# Patient Record
Sex: MALE | NOT HISPANIC OR LATINO | Employment: FULL TIME | URBAN - METROPOLITAN AREA
[De-identification: names, ages, dates, MRNs, and addresses within clinical notes are randomized per-mention and may not be internally consistent; named-entity substitution may affect disease eponyms.]

---

## 2017-01-04 ENCOUNTER — OFFICE VISIT (OUTPATIENT)
Dept: ORTHOPEDIC SURGERY | Facility: CLINIC | Age: 62
End: 2017-01-04

## 2017-01-04 DIAGNOSIS — S43.432D GLENOID LABRUM TEAR, LEFT, SUBSEQUENT ENCOUNTER: Primary | ICD-10-CM

## 2017-01-04 DIAGNOSIS — S46.012D TRAUMATIC ROTATOR CUFF TEAR, LEFT, SUBSEQUENT ENCOUNTER: ICD-10-CM

## 2017-01-04 PROCEDURE — 99213 OFFICE O/P EST LOW 20 MIN: CPT | Performed by: ORTHOPAEDIC SURGERY

## 2017-01-04 NOTE — MR AVS SNAPSHOT
Mert Hewitt   2017 2:15 PM   Office Visit    Dept Phone:  [phone]   Encounter #:  [id number]    Provider:  Haroon Mckoy MD   Department:  [facility]                Your Full Care Plan              Your Updated Medication List          This list is accurate as of: 17  3:04 PM.  Always use your most recent med list.                ferrous sulfate 325 (65 FE) MG tablet       glucosamine sulfate 500 MG capsule capsule       KOMBIGLYZE XR 5-1000 MG tablet sustained-release 24 hour   Generic drug:  SAXagliptin-MetFORMIN ER       lisinopril 5 MG tablet   Commonly known as:  PRINIVIL,ZESTRIL       meloxicam 7.5 MG tablet   Commonly known as:  MOBIC   Take 1 tablet by mouth At Night As Needed for moderate pain (4-6).       MULTI VITAMIN DAILY PO       nateglinide 120 MG tablet   Commonly known as:  STARLIX       ONE TOUCH ULTRA TEST test strip   Generic drug:  glucose blood       pioglitazone 45 MG tablet   Commonly known as:  ACTOS       pravastatin 40 MG tablet   Commonly known as:  PRAVACHOL       terbinafine 250 MG tablet   Commonly known as:  lamiSIL       vitamin B-12 1000 MCG tablet   Commonly known as:  CYANOCOBALAMIN               Instructions     None    Patient Instructions History      Upcoming Appointments     Visit Type Date Time Department    FOLLOW UP 2017  2:15 PM [facility]    FOLLOW UP 2017  2:15 PM [facility]      [facility] Signup     [facility] allows you to send messages to your doctor, view your test results, renew your prescriptions, schedule appointments, and more. To sign up, go to [employer] and click on the Sign Up Now link in the New User? box. Enter your [facility] Activation Code exactly as it appears below along with the last four digits of your Social Security Number and your Date of Birth () to complete the sign-up process. If you do not sign up before  the expiration date, you must request a new code.    [employer] Activation Code: [email or web address]  Expires: 1/18/2017  3:04 PM    If you have questions, you can email The [employer] easily [email or web address] or call [phone] to talk to our [employer] staff. Remember, [employer] is NOT to be used for urgent needs. For medical emergencies, dial 911.               Other Info from Your Visit           Your Appointments     Apr 12, 2017  2:15 PM EDT   Follow Up with Haroon Mckoy MD   [facility] (--)    [address] [place] [address]   [phone]           Arrive 15 minutes prior to appointment.              Allergies     No Known Allergies      Reason for Visit     Left Shoulder - Follow-up           Vital Signs     Smoking Status                   Never Smoker

## 2017-01-04 NOTE — PROGRESS NOTES
Chief Complaint   Patient presents with   • Left Shoulder - Follow-up           HPI  The patient is here today for a follow up of his left shoulder. He has pain on the lateral aspect of the shoulder.  He has worked hard with a home-based physical therapy program and recovered a full range of motion.  He does work with his arm extended into the overhead position, which causes him pain and discomfort.  He has some difficulty with cross body activity as well.  The patient states that when he lifts more than 20 pounds, he has recurrence of pain and is requesting restriction on his work status to be limited to 20 pounds of lifting.  Recently, he has developed bronchitis and his family care physician gave him a Depo-Medrol dosepak, which has helped his symptoms tremendously.  This has helped to decrease his pain and improve his mobility significantly.          There were no vitals filed for this visit.        Review of Systems   All other systems reviewed and are negative.          Physical Exam   Constitutional: He appears well-developed.   HENT:   Head: Normocephalic.   Nose: Nose normal.   Eyes: Conjunctivae are normal. Pupils are equal, round, and reactive to light.   Neck: Normal range of motion.   Cardiovascular: Normal rate and intact distal pulses.    Pulmonary/Chest: Effort normal and breath sounds normal.   Abdominal: Soft. Bowel sounds are normal.   Musculoskeletal: Normal range of motion.   Neurological: He is alert. He has normal reflexes.   Skin: Skin is warm.   Psychiatric: He has a normal mood and affect. His behavior is normal. Judgment and thought content normal.   Vitals reviewed.          Joint/Body Part Specific Exam:  left shoulder. The glenohumeral joint is extremely tender over the anterior aspect of the articulation. Axillary nerve function is well preserved. Radial artery pulses are palpable. Forward flexion is associated with a click and a distinct pop. Neer sign is positive. EER sign is  positive. Bobby sign is positive. Skin and soft tissues are slightly swollen. AC joint is mildly tender. The pain can be reproduced with external rotation, abduction and axial loading of the joint. There is no evidence of multidirectional instability. The pain level is 5.  He has a full ROM in forward flexion, abduction and external rotation.      X-RAY Report:        Diagnostics:        Mert was seen today for follow-up.    Diagnoses and all orders for this visit:    Glenoid labrum tear, left, subsequent encounter    Traumatic rotator cuff tear, left, subsequent encounter          Procedures        Plan:   Continue with home-based exercise program with stretching/strengthening exercise to prevent a frozen shoulder.  Ibuprofen 600 mg tab 1 p.o. b.i.d. p.r.n. pain.  Tablet Mobic 7.5 mg tab 1 p.o. daily.  Nonoperative care discussed with the patient.  He is not interested in a steroid injection because that elevates his blood sugar levels and causes him to have problems with management of his diabetes.  Work restriction limited to maximum lifting of 20 pounds with avoidance of repetitive lifting and loading and no repetitive overhead lifting.  Follow up in my office in 3 months.  Patient is doing well with conservative, nonoperative care.

## 2017-01-04 NOTE — LETTER
January 4, 2017     Patient: Mert Hewitt   YOB: 1955   Date of Visit: 1/4/2017       To Whom It May Concern:    It is my medical opinion that Mert Hewitt may only lift up to a maximum of 20lbs, and may not do any overhead lifting. If you have any questions please feel free to contact my office.        Sincerely,        Haroon Mckoy MD

## 2017-04-12 ENCOUNTER — OFFICE VISIT (OUTPATIENT)
Dept: ORTHOPEDIC SURGERY | Facility: CLINIC | Age: 62
End: 2017-04-12

## 2017-04-12 VITALS — BODY MASS INDEX: 26.34 KG/M2 | WEIGHT: 184 LBS | HEIGHT: 70 IN

## 2017-04-12 DIAGNOSIS — S46.012D TRAUMATIC ROTATOR CUFF TEAR, LEFT, SUBSEQUENT ENCOUNTER: ICD-10-CM

## 2017-04-12 DIAGNOSIS — S43.432D GLENOID LABRUM TEAR, LEFT, SUBSEQUENT ENCOUNTER: Primary | ICD-10-CM

## 2017-04-12 PROCEDURE — 99213 OFFICE O/P EST LOW 20 MIN: CPT | Performed by: ORTHOPAEDIC SURGERY

## 2017-04-12 RX ORDER — SITAGLIPTIN AND METFORMIN HYDROCHLORIDE 1000; 50 MG/1; MG/1
TABLET, FILM COATED ORAL
Refills: 0 | COMMUNITY
Start: 2017-04-08

## 2017-04-12 NOTE — PROGRESS NOTES
Chief Complaint   Patient presents with   • Left Shoulder - Follow-up, Pain           HPI  Patient is here today for a follow up of his left shoulder.  Patient is doing reasonably well at this point.  He is quite active with his shoulder especially in overhead abduction.  He has recently built and outdoor shed with a platform and did use power tools and pneumatic tools without too much difficulty.  He does have some pain and discomfort and an occasional click in the anterior aspect of the glenohumeral joint.  He does feel some amount of pain and discomfort at the end of the day and is occasionally using anti-inflammatory medication to help minimize his symptoms.  He does have some difficulty in sleeping in bed at night especially if his shoulder is in the decubitus and dependent position.  Patient would like to attend some physical therapy in an outpatient basis to help improve his strength and range of motion especially in overhead abduction.        There were no vitals filed for this visit.        Review of Systems   Constitutional: Negative for chills, fever and unexpected weight change.   HENT: Negative for trouble swallowing and voice change.    Eyes: Negative for visual disturbance.   Respiratory: Negative for cough and shortness of breath.    Cardiovascular: Negative for chest pain and leg swelling.   Gastrointestinal: Negative for abdominal pain, nausea and vomiting.   Endocrine: Negative for cold intolerance and heat intolerance.   Genitourinary: Negative for difficulty urinating, frequency and urgency.   Musculoskeletal: Positive for arthralgias and myalgias.   Skin: Negative for rash and wound.   Allergic/Immunologic: Negative for immunocompromised state.   Neurological: Negative for weakness and numbness.   Hematological: Does not bruise/bleed easily.   Psychiatric/Behavioral: Negative for dysphoric mood. The patient is not nervous/anxious.            Physical Exam   Constitutional: He appears  well-developed and well-nourished.   HENT:   Head: Normocephalic and atraumatic.   Eyes: EOM are normal. Pupils are equal, round, and reactive to light.   Neck: Normal range of motion. Neck supple.   Cardiovascular: Normal rate and intact distal pulses.    Pulmonary/Chest: Effort normal and breath sounds normal.   Abdominal: Soft. Bowel sounds are normal.   Musculoskeletal: Normal range of motion. He exhibits edema and tenderness.   Neurological: He is alert. He has normal reflexes.   Skin: Skin is warm and dry.   Psychiatric: He has a normal mood and affect. His behavior is normal. Judgment and thought content normal.   Nursing note and vitals reviewed.          Joint/Body Part Specific Exam:    left shoulder. The shoulder is extremely tender anteriorly. There is tenderness over the insertion of the rotator cuff over the greater tuberosity of the humerus. Slight proximal migration of the humeral head is noted. AC joint is tender. Crossover adduction test is positive. Drop arm sign is positive. Forward flexion is 0-140° degrees, abduction is 0-160° degrees, external rotation is 0-40° degrees. Patient has mild atrophy both of the supra and infraspinatus fossa. Sulcus sign is negative. There is no evidence of multidirectional instability. Neer test is positive on compression. Skin and soft tissue tenderness is noted. Patient’s strength in abduction and external rotation are extremely lacking. The pain level is 2.    X-RAY Report:        Diagnostics:  Prior MRI reports were discussed with the patient great gisele Painting was seen today for follow-up and pain.    Diagnoses and all orders for this visit:    Glenoid labrum tear, left, subsequent encounter  -     Ambulatory Referral to Physical Therapy Evaluate and treat    Traumatic rotator cuff tear, left, subsequent encounter  -     Ambulatory Referral to Physical Therapy Evaluate and treat          Procedures        Plan:  Stretching and strengthening exercises  including the use of an overhead pulley.    Tablet Mobic 7.5 mg tab 1 by mouth daily at bedtime when necessary pain and discomfort.    Re-injury precautions.    Note for physical therapy given to the patient so that he can participate in an outpatient physical therapy program.    Follow-up in my office in 3 months.    Injection with steroid discussed with the patient and offered to him but he states his pain is not severe enough to require that.

## 2017-07-19 ENCOUNTER — OFFICE VISIT (OUTPATIENT)
Dept: ORTHOPEDIC SURGERY | Facility: CLINIC | Age: 62
End: 2017-07-19

## 2017-07-19 VITALS — BODY MASS INDEX: 26.63 KG/M2 | HEIGHT: 70 IN | WEIGHT: 186 LBS | TEMPERATURE: 98.4 F

## 2017-07-19 DIAGNOSIS — S43.432D GLENOID LABRUM TEAR, LEFT, SUBSEQUENT ENCOUNTER: ICD-10-CM

## 2017-07-19 DIAGNOSIS — S46.012D TRAUMATIC ROTATOR CUFF TEAR, LEFT, SUBSEQUENT ENCOUNTER: Primary | ICD-10-CM

## 2017-07-19 PROCEDURE — 99213 OFFICE O/P EST LOW 20 MIN: CPT | Performed by: ORTHOPAEDIC SURGERY

## 2017-07-19 NOTE — PROGRESS NOTES
Chief Complaint   Patient presents with   • Left Shoulder - Follow-up, Pain           HPI  PT is here for F/U for LT Shoulder Pain and discomfort.  Patient has some weakness in abduction and difficulty in reaching into the overhead position.  He has some nighttime pain and discomfort as well.  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  He is managing his symptoms well with conservative, nonoperative care.  He states that he might have recently hurt his knee and his calf because he has a lot of pain and tenderness over the gastroc musculature.  He has some difficulty with full plantar flexion of the hindfoot.  This does not cause him difficulty with walking and overall seems to be actually improving since the time of his injury.        Vitals:    07/19/17 1519   Temp: 98.4 °F (36.9 °C)           Review of Systems   Constitutional: Negative.    HENT: Negative.    Eyes: Negative.    Respiratory: Negative.    Cardiovascular: Negative.    Gastrointestinal: Negative.    Endocrine: Negative.    Genitourinary: Negative.    Musculoskeletal: Negative.    Skin: Negative.    Allergic/Immunologic: Negative.    Neurological: Negative.    Hematological: Negative.    Psychiatric/Behavioral: Negative.            Physical Exam   Constitutional: He is oriented to person, place, and time. He appears well-nourished.   HENT:   Head: Atraumatic.   Eyes: EOM are normal.   Neck: Neck supple.   Cardiovascular: Normal rate and intact distal pulses.    Pulmonary/Chest: Effort normal and breath sounds normal.   Abdominal: Soft. Bowel sounds are normal.   Musculoskeletal: Normal range of motion.   Neurological: He is alert and oriented to person, place, and time. He has normal reflexes.   Skin: Skin is dry.   Psychiatric: He has a normal mood and affect. His behavior is normal. Judgment and thought  content normal.   Nursing note and vitals reviewed.          Joint/Body Part Specific Exam:  left Shoulder. Patient has signs of impingement with internal and external rotation. There is a lot of pain and tenderness for the patient over the subcromial bursa. Bobby’s sign is positive. Neer sign is positive. Forward flexion is 0-120 degrees, abduction is 0-130° degrees, external rotation is 0-40 degrees. Rotator cuff function is fairly well preserved except for the impingement at 90 degrees. Apprehension sign is negative. Axillary nerve function is well preserved. Radial artery pulses are palpable. There is no evidence of multidirectional instability. Sulcus sign is negative. Drop arm sign is negative. The patient has some ill-defined tenderness over the greater tuberosity of the humerus. The pain level is 3.    Right calf: Patient has pain and tenderness in the mid gastroc.  He possibly has a muscle strain.  There is no evidence of Achilles tendon injury.  He is neurovascularly intact.  He can easily perform a single heel raise.  There is some prominence of the muscle fibers and an underlying partial thickness tear of the gastroc muscle cannot be ruled out.  X-RAY Report:        Diagnostics:        Mert was seen today for follow-up and pain.    Diagnoses and all orders for this visit:    Traumatic rotator cuff tear, left, subsequent encounter    Glenoid labrum tear, left, subsequent encounter          Procedures        Plan:  Stretching and strengthening exercises to prevent a frozen shoulder.    Tablet Mobic 7.5 mg tab 1 by mouth daily for pain swelling and inflammation.    Reinjury precautions.    Calcium and vitamin D for bone health.    Work note given to the patient to avoid active abduction and minimize the amount of weight he lifts in the overhead position to minimize the possibility of reinjury while he is at work.    Intra-articular steroid injection discussed and offered to the patient but he  declines.    Follow-up in my office in 3 months.    Nonoperative management of the gastroc muscle strain.

## 2017-11-01 ENCOUNTER — CLINICAL SUPPORT (OUTPATIENT)
Dept: ORTHOPEDIC SURGERY | Facility: CLINIC | Age: 62
End: 2017-11-01

## 2017-11-01 DIAGNOSIS — S46.012D TRAUMATIC INCOMPLETE TEAR OF LEFT ROTATOR CUFF, SUBSEQUENT ENCOUNTER: Primary | ICD-10-CM

## 2017-11-01 PROCEDURE — 99213 OFFICE O/P EST LOW 20 MIN: CPT | Performed by: ORTHOPAEDIC SURGERY

## 2017-11-01 NOTE — PROGRESS NOTES
Chief Complaint   Patient presents with   • Left Shoulder - Follow-up     Chief complaint: left shoulder pain with limited range of motion.      HPI The patient is here today for a follow up of his left shoulder.  Patient has pain and discomfort on the lateral aspect of the left shoulder.  He has some weakness in abduction.  He finds it difficult to sleep in bed at night because of the pain and discomfort.  He cannot lie down with his left shoulder in a recumbent position.  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  This change in occupation has made his life a whole lot better.  His pain swelling and discomfort have all significantly decreased since his job description has changed.  The patient is not sure how long he will be able to keep his new position but in any event he is much less symptomatic at this point.  He denies any recent history of trauma.  He does use his upper extremity in the abducted position on a repetitive basis.        There were no vitals filed for this visit.        Review of Systems   Constitutional: Negative.    HENT: Negative.    Respiratory: Negative.    Cardiovascular: Negative.    Gastrointestinal: Negative.    Endocrine: Negative.    Genitourinary: Negative.    Musculoskeletal: Positive for joint swelling and neck pain.   Skin: Negative.    Allergic/Immunologic: Negative.    Neurological: Negative.    Hematological: Negative.    Psychiatric/Behavioral: Negative.            Physical Exam   Constitutional: He is oriented to person, place, and time. He appears well-nourished.   HENT:   Head: Atraumatic.   Eyes: EOM are normal.   Neck: Neck supple.   Cardiovascular: Intact distal pulses.    Pulmonary/Chest: Breath sounds normal.   Abdominal: Bowel sounds are normal.   Musculoskeletal: He exhibits edema and tenderness. He exhibits no deformity.   Neurological: He is alert and oriented to person, place, and time. He has normal reflexes.    Skin: Skin is dry.   Psychiatric: He has a normal mood and affect. His behavior is normal. Judgment and thought content normal.   Nursing note and vitals reviewed.          Joint/Body Part Specific Exam:  right Shoulder. Patient has signs of impingement with internal and external rotation. There is a lot of pain and tenderness for the patient over the subcromial bursa. Bobby’s sign is positive. Neer sign is positive. Forward flexion is 0-120° degrees, abduction is 0-130° degrees, external rotation is 0-30° degrees. Rotator cuff function is fairly well preserved except for the impingement at 90 degrees. Apprehension sign is negative. Axillary nerve function is well preserved. Radial artery pulses are palpable. There is no evidence of multidirectional instability. Sulcus sign is negative. Drop arm sign is negative. The patient has some ill-defined tenderness over the greater tuberosity of the humerus. The pain level is 3.  Apprehension sign is negative.  Sulcus sign is negative.  There is no evidence of multidirectional instability.      X-RAY Report:        Diagnostics:        Mert was seen today for follow-up.    Diagnoses and all orders for this visit:    Traumatic incomplete tear of left rotator cuff, subsequent encounter          Procedures        Plan:  Continue with physical therapy with a home based exercise program with stretching and strengthening exercising including the use of overhead pulleys.    Calcium and vitamin D for bone health.    Tablet ibuprofen 600 mg orally twice a day when necessary pain and discomfort.    Avoid repetitive loading of the shoulder.    Patient has a prescription of Mobic 7.5 mg tab 1 by mouth daily at bedtime when necessary pain and swelling.  I have refilled his prescription for him.    Avoid injury to the shoulder.    Possibility of a steroid injection to help minimize his symptoms has been discussed and offered to the patient.    Nonoperative care for the patient at  this point.    Follow-up in office in 3 months.

## 2018-10-24 ENCOUNTER — OFFICE VISIT (OUTPATIENT)
Dept: ORTHOPEDIC SURGERY | Facility: CLINIC | Age: 63
End: 2018-10-24

## 2018-10-24 VITALS — BODY MASS INDEX: 26.63 KG/M2 | WEIGHT: 186 LBS | HEIGHT: 70 IN

## 2018-10-24 DIAGNOSIS — M25.511 CHRONIC RIGHT SHOULDER PAIN: Primary | ICD-10-CM

## 2018-10-24 DIAGNOSIS — G89.29 CHRONIC RIGHT SHOULDER PAIN: Primary | ICD-10-CM

## 2018-10-24 PROCEDURE — 99213 OFFICE O/P EST LOW 20 MIN: CPT | Performed by: ORTHOPAEDIC SURGERY

## 2018-10-24 PROCEDURE — 73030 X-RAY EXAM OF SHOULDER: CPT | Performed by: ORTHOPAEDIC SURGERY

## 2018-10-24 NOTE — PROGRESS NOTES
Chief Complaint   Patient presents with   • Right Shoulder - Pain             HPI the patient is here today for right shoulder pain and discomfort.  His symptoms have become progressively worse over the past few months.  He denies any history of trauma.  The patient states that he is working quite hard lifting heavy objects to be successful in his new job.  He feels that he might have injured his shoulder at some point during the transfer process.  He does not have any weakness in abduction.  There is progressive pain and discomfort and he cannot reach beyond 90° of forward flexion or abduction at the shoulder.  He has quite a bit of nighttime pain and discomfort.  Hot showers seem to help his symptoms in terms of decreased pain.  He is a diabetic and is trying hard to control his blood sugar levels.  He had a similar frozen shoulder on the left side that ended up needing a manipulation under anesthesia several years ago.  The patient thinks that he has the same pathology on the right shoulder.  I do tend to agree with his symptoms and his evaluation.           No Known Allergies      Social History     Social History   • Marital status:      Spouse name: N/A   • Number of children: N/A   • Years of education: N/A     Occupational History   • Not on file.     Social History Main Topics   • Smoking status: Never Smoker   • Smokeless tobacco: Not on file   • Alcohol use No   • Drug use: No   • Sexual activity: Not on file     Other Topics Concern   • Not on file     Social History Narrative   • No narrative on file       Family History   Problem Relation Age of Onset   • Diabetes Other        Past Surgical History:   Procedure Laterality Date   • KNEE SURGERY Right 2012   • SHOULDER SURGERY Left 2012    Manipulation        Past Medical History:   Diagnosis Date   • Anemia    • Diabetes mellitus (CMS/[facility])            There were no vitals filed for this visit.          Review of Systems   Constitutional: Negative.     HENT: Negative.    Eyes: Negative.    Respiratory: Negative.    Cardiovascular: Negative.    Gastrointestinal: Negative.    Endocrine: Negative.    Genitourinary: Negative.    Musculoskeletal: Positive for gait problem.   Skin: Negative.    Allergic/Immunologic: Negative.    Hematological: Negative.    Psychiatric/Behavioral: Negative.            Physical Exam   Constitutional: He is oriented to person, place, and time. He appears well-nourished.   HENT:   Head: Atraumatic.   Eyes: EOM are normal.   Neck: Neck supple.   Cardiovascular: Normal rate, regular rhythm and normal heart sounds.    Pulmonary/Chest: Effort normal and breath sounds normal.   Abdominal: Bowel sounds are normal.   Musculoskeletal: He exhibits edema and tenderness.   Neurological: He is oriented to person, place, and time.   Skin: Skin is warm. Capillary refill takes 2 to 3 seconds.   Psychiatric: He has a normal mood and affect. His behavior is normal. Thought content normal.   Nursing note and vitals reviewed.              Joint/Body Part Specific Exam:  right Shoulder. The shoulder range of motion is extremely limited both in forward flexion, abduction and external rotation. Rotator cuff function cannot be determined because of lack of motion. The scapula is moving with the humerus upon attempted abduction. The range of motion is abduction 0-80 degrees, forward fexion 0-90 degrees, external rotation 0-10 degrees.  Skin and soft tissues are somewhat swollen and tender. There is anterior joint line tenderness. There is some winging of the scapula. Axillary nerve function is well preserved. Radial artery pulses are palpable. The motion is extremely limited when compared to the contralateral extremity. The pain level is 5 .          X-RAY Report:  right Shoulder X-Ray  Indication: pain and limited ROM of the shoulder  AP and Lateral  Findings: sclerosis over greater tuberosity of humerus  no bony lesion  Soft tissues within normal  limits  decreased joint spaces  Hardware appropriately positioned not applicable      no prior studies available for comparison.    X-RAY was ordered and reviewed by Haroon Mckoy MD              Diagnostics:            Mert was seen today for pain.    Diagnoses and all orders for this visit:    Chronic right shoulder pain  -     XR Shoulder 2+ View Right            Procedures          I provided this patient with educational materials regarding exercise and bone health.        Plan: Stretching and strengthening exercises of the right shoulder to overcome the adhesive capsulitis and a frozen shoulder.    Ice to the shoulder for comfort.    Tablet ibuprofen 600 mg orally twice a day for pain swelling and discomfort.    Discussed with the patient about steroid injection intra-articularly to the shoulder and to the knee on the right side.    I would like to manage his condition nonoperatively at this point but eventually he is going to need a manipulation of the shoulder under anesthesia followed by aggressive physical therapy.    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    He will let me know when he is ready for the manipulation and we will schedule that for him on an outpatient basis.  He does understand that this is a staged procedure and he might eventually require arthroscopic surgery with lysis of adhesions to alleviate all his symptoms.    Follow-up in 3 months for reevaluation.          CC To Shane Skinner MD   Detail Level: Zone

## 2021-01-04 ENCOUNTER — OFFICE VISIT (OUTPATIENT)
Dept: ORTHOPEDIC SURGERY | Facility: CLINIC | Age: 66
End: 2021-01-04

## 2021-01-04 ENCOUNTER — HOSPITAL ENCOUNTER (OUTPATIENT)
Dept: OTHER | Facility: HOSPITAL | Age: 66
Discharge: HOME | End: 2021-01-04
Attending: ORTHOPAEDIC SURGERY

## 2021-01-04 VITALS — HEIGHT: 63 IN | BODY MASS INDEX: 31.54 KG/M2 | WEIGHT: 178 LBS | TEMPERATURE: 98.2 F

## 2021-01-04 DIAGNOSIS — M25.562 LEFT KNEE PAIN, UNSPECIFIED CHRONICITY: Primary | ICD-10-CM

## 2021-01-04 DIAGNOSIS — M17.12 PRIMARY OSTEOARTHRITIS OF ONE KNEE, LEFT: Primary | ICD-10-CM

## 2021-01-04 DIAGNOSIS — R52 PAIN: ICD-10-CM

## 2021-01-04 PROCEDURE — 20610 DRAIN/INJ JOINT/BURSA W/O US: CPT | Performed by: ORTHOPAEDIC SURGERY

## 2021-01-04 PROCEDURE — 99213 OFFICE O/P EST LOW 20 MIN: CPT | Performed by: ORTHOPAEDIC SURGERY

## 2021-01-04 RX ORDER — GLIPIZIDE 5 MG/1
5 TABLET, FILM COATED, EXTENDED RELEASE ORAL DAILY
COMMUNITY
Start: 2020-12-14

## 2021-01-04 RX ORDER — MELOXICAM 7.5 MG/1
7.5 TABLET ORAL NIGHTLY PRN
Qty: 40 TABLET | Refills: 0 | Status: SHIPPED | OUTPATIENT
Start: 2021-01-04 | End: 2021-02-09

## 2021-01-04 RX ADMIN — METHYLPREDNISOLONE ACETATE 160 MG: 80 INJECTION, SUSPENSION INTRA-ARTICULAR; INTRALESIONAL; INTRAMUSCULAR; SOFT TISSUE at 14:52

## 2021-01-04 NOTE — PROGRESS NOTES
NEW VISIT    Patient: Mert Hewitt  ?  YOB: 1955    MRN: [id number]  ?  Chief Complaint   Patient presents with   • Left Knee - Pain   • Establish Care      ?  HPI:   Mert Lucero returns back to the office with a new complaint.  He has had 3 days of left knee pain and discomfort.  He developed significant amount of swelling on the medial aspect of the knee.  He was helping to perform a home improvement project with his knee in deep flexion.  He felt a pop on the medial aspect of the knee.  I have discussed with him that most likely this would be consistent with a degenerative tear of the medial meniscus.  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  Initially he had quite a bit of discomfort with full weightbearing with his knee fully extended.  That symptom seems to have gotten somewhat better at this point.  The patient has had a prior open partial medial meniscectomy in 1985.  There is no doubt in my mind that surgical procedure has led to premature degenerative osteoarthritis of the knee on the medial compartment.      Pain Location: LEFT knee  Radiation: none  Quality: dull, aching  Intensity/Severity: moderate  Duration: 3 days  Onset quality: gradual   Timing: constant  Aggravating Factors: rising after sitting, squatting  Alleviating Factors: NSAIDs  Previous Episodes: none mentioned  Associated Symptoms: pain, swelling, clicking/popping  ADLs Affected: ambulating, work related activities  Previous Treatment: Using a supportive brace.    This patient is an established patient.  This problem is new to this examiner.      Allergies: No Known Allergies    Medications:   Home Medications:  Current Outpatient Medications on File Prior to Visit   Medication Sig   • ferrous sulfate 325 (65 FE) MG tablet Take 325 mg by mouth daily with breakfast.   • glipizide (GLUCOTROL XL) 5 MG ER tablet Take 5 mg by mouth Daily.   • glucosamine sulfate 500 MG capsule  capsule Take  by mouth 3 (three) times a day with meals.   • JANUMET  MG per tablet take 1 tablet by mouth twice a day   • KOMBIGLYZE XR 5-1000 MG tablet sustained-release 24 hour    • lisinopril (PRINIVIL,ZESTRIL) 5 MG tablet Take 5 mg by mouth daily.   • Multiple Vitamin (MULTI VITAMIN DAILY PO) Take  by mouth.   • nateglinide (STARLIX) 120 MG tablet Take 120 mg by mouth 3 (three) times a day before meals.   • ONE TOUCH ULTRA TEST test strip TEST three times a day   • pioglitazone (ACTOS) 45 MG tablet Take 45 mg by mouth daily.   • pravastatin (PRAVACHOL) 40 MG tablet Take 40 mg by mouth daily.   • terbinafine (LamiSIL) 250 MG tablet take 1 tablet by mouth once daily   • vitamin B-12 (CYANOCOBALAMIN) 1000 MCG tablet Take 1,000 mcg by mouth daily.     No current facility-administered medications on file prior to visit.      Current Medications:  Scheduled Meds:  PRN Meds:.    I have reviewed the patient's medical history in detail and updated the computerized patient record.  Review and summarization of old records include:    Past Medical History:   Diagnosis Date   • Anemia    • Diabetes mellitus (CMS/HCC)      Past Surgical History:   Procedure Laterality Date   • KNEE SURGERY Right 2012   • SHOULDER SURGERY Left 2012    Manipulation      Social History     Occupational History   • Not on file   Tobacco Use   • Smoking status: Never Smoker   • Smokeless tobacco: Never Used   Substance and Sexual Activity   • Alcohol use: No   • Drug use: No   • Sexual activity: Not on file      Family History   Problem Relation Age of Onset   • Diabetes Other          Review of Systems   Constitutional: Negative.  Negative for fever.   HENT: Negative.    Eyes: Negative.    Respiratory: Negative.    Cardiovascular: Negative.    Endocrine: Negative.    Musculoskeletal: Positive for arthralgias, gait problem and joint swelling.   Skin: Negative.  Negative for rash and wound.   Allergic/Immunologic: Negative.    Neurological:  "Negative for numbness.   Hematological: Negative.    Psychiatric/Behavioral: Negative.           Wt Readings from Last 3 Encounters:   01/04/21 80.7 kg (178 lb)   10/24/18 84.4 kg (186 lb)   07/19/17 84.4 kg (186 lb)     Ht Readings from Last 3 Encounters:   01/04/21 160 cm (63\")   10/24/18 177.8 cm (70\")   07/19/17 177.8 cm (70\")     Body mass index is 31.53 kg/m².  Facility age limit for growth percentiles is 20 years.  Vitals:    01/04/21 1444   Temp: 98.2 °F (36.8 °C)         Physical Exam  Constitutional: Patient is oriented to person, place, and time. Appears well-developed and well-nourished.   HENT:   Head: Normocephalic and atraumatic.   Eyes: Conjunctivae and EOM are normal. Pupils are equal, round, and reactive to light.   Cardiovascular: Normal rate, regular rhythm, normal heart sounds and intact distal pulses.   Pulmonary/Chest: Effort normal and breath sounds normal.   Musculoskeletal:   See detailed exam below   Neurological: Alert and oriented to person, place, and time. No sensory deficit. Coordination normal.   Skin: Skin is warm and dry. Capillary refill takes less than 2 seconds. No rash noted. No erythema.   Psychiatric: Patient has a normal mood and affect. His behavior is normal. Judgment and thought content normal.   Nursing note and vitals reviewed.      Ortho Exam:   Left knee (meniscus). The knee joint shows effusion with some thickening of the synovial membrane. There is tenderness over the meniscus. Apley's grinding test is positive over the joint line. Caroline's sign is positive with increased pain on torsional testing. There is a distinct click in mid flexion. Range of motion is from 0-110 degrees of flexion. No instability on medial or lateral testing. Anterior and posterior drawer testing is negative. Lachman test is negative. Joint line tenderness is present to direct palpation. There is some tenderness over the medial face of the tibia just distal to the joint line. The dorsalis " pedis and posterior tibial artery pulses are palpable. Common peroneal nerve function is well preserved. Gait is cautious and somewhat antalgic. Full extension causes the patient to have quite a bit of pain and discomfort.   The patient has a healed incision on the medial aspect of the knee consistent with an open partial medial meniscectomy performed in 1985.        Diagnostics:  Left knee xrays ap/lateral views were ordered by Haroon Mckoy MD. Performed at [facility] on 01/04/2021. Images were independently viewed and interpreted by myself, my impression as follows:    left Knee X-Ray  Indication: Evaluation of pain and discomfort on the medial aspect of the knee.  AP, Lateral views  Findings: Significant narrowing of the medial joint space with a varus deformity and bone-on-bone appearance is noted.  no bony lesion  Soft tissues within normal limits  decreased joint spaces  Hardware appropriately positioned not applicable      yes prior studies available for comparison.    This patient's x-ray report was graded according to the Kellgren and Juancho classification.  This took into account the joint space narrowing, osteophyte formation, sclerosis of the distal femur/proximal tibia along with deformity of those bones.  The findings were indicative of K L grade 3.    X-RAY was ordered and reviewed by Haroon Mckoy MD      Assessment:  Diagnoses and all orders for this visit:    1. Primary osteoarthritis of one knee, left (Primary)  -     Large Joint Arthrocentesis: L knee          Large Joint Arthrocentesis: L knee  Date/Time: 1/4/2021 2:52 PM  Consent given by: patient  Site marked: site marked  Timeout: Immediately prior to procedure a time out was called to verify the correct patient, procedure, equipment, support staff and site/side marked as required   Supporting Documentation  Indications: pain and joint swelling   Procedure Details  Location: knee - L knee  Preparation: Patient was  prepped and draped in the usual sterile fashion  Needle size: 25 G  Approach: anteromedial  Medications administered: 2 mL lidocaine (cardiac); 160 mg methylPREDNISolone acetate 80 MG/ML  Patient tolerance: patient tolerated the procedure well with no immediate complications        ?    Plan    · Compression/brace to the knee to prevent it from buckling giving her.  · Injected patient's left knee with a steroid from an anteromedial approach.  · Tablet meloxicam 7.5 mg tab 1 p.o. daily for pain swelling and discomfort.  · Intra-articular viscosupplementation injections discussed and offered to the patient.  · There is no doubt in my mind that he is going to need a knee replacement surgery in the near future based on symptom progression.  The patient states that he will let me know when he is ready for that form of intervention.  · Rest, ice, compression, and elevation (RICE) therapy  · Stretching and strengthening exercises of the quads and the hamstrings  · Tylenol 500-1000mg by mouth every 6 hours as needed for pain   · Follow up in 3 month(s)    Date of encounter: 01/04/2021   Haroon Mckoy MD

## 2021-01-05 PROBLEM — M17.12 PRIMARY OSTEOARTHRITIS OF ONE KNEE, LEFT: Status: ACTIVE | Noted: 2021-01-05

## 2021-01-07 RX ORDER — METHYLPREDNISOLONE ACETATE 80 MG/ML
160 INJECTION, SUSPENSION INTRA-ARTICULAR; INTRALESIONAL; INTRAMUSCULAR; SOFT TISSUE
Status: COMPLETED | OUTPATIENT
Start: 2021-01-04 | End: 2021-01-04

## 2021-02-08 DIAGNOSIS — R52 PAIN: ICD-10-CM

## 2021-02-09 RX ORDER — MELOXICAM 7.5 MG/1
7.5 TABLET ORAL NIGHTLY PRN
Qty: 40 TABLET | Refills: 0 | Status: SHIPPED | OUTPATIENT
Start: 2021-02-09

## 2021-04-13 ENCOUNTER — OFFICE VISIT (OUTPATIENT)
Dept: ORTHOPEDIC SURGERY | Facility: CLINIC | Age: 66
End: 2021-04-13

## 2021-04-13 DIAGNOSIS — M17.12 PRIMARY OSTEOARTHRITIS OF ONE KNEE, LEFT: Primary | ICD-10-CM

## 2021-04-13 PROCEDURE — 99213 OFFICE O/P EST LOW 20 MIN: CPT | Performed by: ORTHOPAEDIC SURGERY

## 2021-04-13 NOTE — PROGRESS NOTES
INJECTION    Patient: Mert Hewitt    YOB: 1955    MRN: [id number]    Chief Complaint   Patient presents with   • Left Knee - Follow-up       History of Present Illness: {Joint injection HPI AS:32195}    This problem {IS/ IS NOT:69303} new to this examiner.     Allergies: No Known Allergies    Medications:   Home Medications:  Current Outpatient Medications on File Prior to Visit   Medication Sig   • ferrous sulfate 325 (65 FE) MG tablet Take 325 mg by mouth daily with breakfast.   • glipizide (GLUCOTROL XL) 5 MG ER tablet Take 5 mg by mouth Daily.   • glucosamine sulfate 500 MG capsule capsule Take  by mouth 3 (three) times a day with meals.   • JANUMET  MG per tablet take 1 tablet by mouth twice a day   • KOMBIGLYZE XR 5-1000 MG tablet sustained-release 24 hour    • lisinopril (PRINIVIL,ZESTRIL) 5 MG tablet Take 5 mg by mouth daily.   • meloxicam (MOBIC) 7.5 MG tablet TAKE 1 TABLET BY MOUTH AT NIGHT AS NEEDED FOR MODERATE PAIN   • Multiple Vitamin (MULTI VITAMIN DAILY PO) Take  by mouth.   • nateglinide (STARLIX) 120 MG tablet Take 120 mg by mouth 3 (three) times a day before meals.   • ONE TOUCH ULTRA TEST test strip TEST three times a day   • pioglitazone (ACTOS) 45 MG tablet Take 45 mg by mouth daily.   • pravastatin (PRAVACHOL) 40 MG tablet Take 40 mg by mouth daily.   • terbinafine (LamiSIL) 250 MG tablet take 1 tablet by mouth once daily   • vitamin B-12 (CYANOCOBALAMIN) 1000 MCG tablet Take 1,000 mcg by mouth daily.     No current facility-administered medications on file prior to visit.     Current Medications:  Scheduled Meds:  PRN Meds:.    I have reviewed the patient's medical history in detail and updated the computerized patient record.  Review and summarization of old records include:    Past Medical History:   Diagnosis Date   • Anemia    • Diabetes mellitus (CMS/HCC)      Past Surgical History:   Procedure Laterality Date   • KNEE SURGERY Right 2012   • SHOULDER SURGERY  "Left 2012    Manipulation      Social History     Occupational History   • Not on file   Tobacco Use   • Smoking status: Never Smoker   • Smokeless tobacco: Never Used   Substance and Sexual Activity   • Alcohol use: No   • Drug use: No   • Sexual activity: Not on file      Social History     Social History Narrative   • Not on file     Family History   Problem Relation Age of Onset   • Diabetes Other        Review of Systems        Wt Readings from Last 3 Encounters:   01/04/21 80.7 kg (178 lb)   10/24/18 84.4 kg (186 lb)   07/19/17 84.4 kg (186 lb)     Ht Readings from Last 3 Encounters:   01/04/21 160 cm (63\")   10/24/18 177.8 cm (70\")   07/19/17 177.8 cm (70\")     There is no height or weight on file to calculate BMI.  No height and weight on file for this encounter.  There were no vitals filed for this visit.    Physical Exam    Musculoskeletal:    {Musculoskeletal Exams:93175}      Diagnostics:      Procedure:  Procedures      Assessment:   There are no diagnoses linked to this encounter.      Plan:   · Injected patient's {RIGHT/LEFT:12327} {Body Location for PLAN ORTHO:44498} joint(s)with {Joint Injection Medication Choices:55787} from an {anteromedial, anterolateral:63615} approach   · Compression/brace   · Rest, ice, compression, and elevation (RICE) therapy  · OTC {OTC pain:21008}  · Follow up in *** {WEEKS/MONTHS:47643}    Date of encounter: 04/13/2021   Hanna Huerta MA  "

## 2021-04-13 NOTE — PROGRESS NOTES
Chief Complaint  Follow-up of the Left Knee    Subjective    History of Present Illness      Mert Hewitt is a 65 y.o. male who presents to [facility] for persistent left knee pain and discomfort.  History of Present Illness   The patient presents to the office with ongoing left knee pain and discomfort. He has done well with the previous steroid injections. He states that that the pain is mostly on the medial aspect of the knee. He does have difficulty going up and down the steps. He is diabetic and does complain of lower extremity cramps at night. I recommended that he should follow up with his primary care physician in regards to his blood electrolyte levels, including his sodium and potassium levels. He states that he has been on Mobic which he has used episodically and is a very helpful medication with managing his symptoms. 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 Ambulating on the concrete floor bothers him quite significantly. He denies any recent trauma to the knee.    Pain Location:  Medial aspect of the left  knee and the hamstrings posteriorly.  Radiation: none  Quality: dull, aching  Intensity/Severity: moderate to severe  Duration: Several months.  Progression of symptoms: Yes, slowly.  Onset quality: gradual   Timing: intermittent  Aggravating Factors: Flexion of the knee with deep squatting and walking on a concrete surface.  Alleviating Factors: Anti-inflammatory medication including Mobic.  Previous Episodes: yes  Associated Symptoms: Pain with crepitus and limited range of motion.   ADLs Affected: Yes, including professional activities.   Previous Treatment: Mobic       Objective   Vital Signs:   There were no vitals taken for this visit.    Physical Exam  Physical Exam  Vitals signs and nursing note reviewed.   Constitutional:        Appearance: Normal appearance.   Pulmonary:      Effort: Pulmonary effort is normal.   Skin:     General: Skin is warm and dry.      Capillary Refill: Capillary refill takes less than 2 seconds.   Neurological:      General: No focal deficit present.      Mental Status: He is alert and oriented to person, place, and time. Mental status is at baseline.   Psychiatric:         Mood and Affect: Mood normal.         Behavior: Behavior normal.         Thought Content: Thought content normal.         Judgment: Judgment normal.     Ortho Exam   Left knee (varus). Patient has crepitus throughout range of motion. Positive patellar grind test. Mild effusion. Lachman is negative. Pivot shift is negative. Anterior and posterior drawer signs are negative. Significant joint line tenderness is noted on the medial aspect of the knee. Patient has a varus orientation of the knee. There is fullness and tenderness in the Popliteal fossa. Mild distention of a Popliteal cyst is noted in this location. Range of motion in flexion is from 0-120 degrees. Neurovascular status is intact.  Dorsalis pedis and posterior tibial artery pulses are palpable. Common peroneal nerve function is well preserved. Patient's gait is cautious and antalgic. Skin and soft tissues are mildly swollen, consistent with synovitis and effusion. The patient has a significant limp with the first few steps after starting the gait cycle. Getting out of a chair takes a lot of effort due to pain on knee flexion.           Result Review :   The following data was reviewed by: Haroon Mckoy MD on 04/13/2021:  Radiologic studies - see below for interpretation  no diagnostic testing performed this visit      Previous x-rays are reviewed. There is medial joint space narrowing in a varus alignment of the knee. The patellofemoral distance is narrowed.      Procedures           Assessment   Assessment and Plan    Diagnoses and all orders for this visit:    1. Primary osteoarthritis  of one knee, left (Primary)        I spent 30 minutes caring for Mert on this date of service. This time includes time spent by me in the following activities:preparing for the visit, reviewing tests and obtaining and/or reviewing a separately obtained history  Follow Up   · The intra-articular steroid injection is deferred due to the patient being diabetic and is concerned about the elevation of his blood sugar levels post the steroid injection.   · A viscosupplementation injection is discussed with the patient.  · Tablet meloxicam 7.5 mg tab1 by mouth daily for pain, swelling, and discomfort.  · Calcium and vitamin D for bone health.   · Eventually he will need a knee arthroplasty surgery, but at this point we are recommending nonoperative management.  · Compression/brace  · Rest, ice, compression, and elevation (RICE) therapy  · Stretching and strengthening exercises  · OTC Tylenol 500-1000mg by mouth every 6 hours as needed for pain   · Follow up in 6 month(s)  • Patient was given instructions and counseling regarding his condition or for health maintenance advice. Please see specific information pulled into the AVS if appropriate.     Haroon Mckoy MD   Date of Encounter: 4/13/2021        Scribed for Haroon Mckoy MD by Ankit Alvarez.  04/15/21   19:07 EDT    I have personally performed the services described in this document as scribed by the above individual, and it is both accurate and complete.  Haroon Mckoy MD  4/15/2021  21:36 EDT

## 2021-11-17 ENCOUNTER — OFFICE VISIT (OUTPATIENT)
Dept: ORTHOPEDIC SURGERY | Facility: CLINIC | Age: 66
End: 2021-11-17

## 2021-11-17 DIAGNOSIS — M17.12 PRIMARY OSTEOARTHRITIS OF ONE KNEE, LEFT: ICD-10-CM

## 2021-11-17 DIAGNOSIS — M25.552 LEFT HIP PAIN: Primary | ICD-10-CM

## 2021-11-17 PROCEDURE — 99213 OFFICE O/P EST LOW 20 MIN: CPT | Performed by: ORTHOPAEDIC SURGERY

## 2021-11-17 NOTE — PROGRESS NOTES
Chief Complaint  Follow-up of the Left Knee and Follow-up of the Left Hip    Subjective    History of Present Illness      Mert Hewitt is a 66 y.o. male who presents to [facility] for left hip and left knee pain.  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  He states that his left knee is a lot better with previous treatment including injections.  At this point he is doing well with using a brace and home-based exercise program.  He states that it is his left hip that is bothering him quite a bit.  Pain and discomfort is over the left greater trochanter.  There is no history of trauma to the hip.  He does not have any risk factors for avascular necrosis.  The patient states that he would like to defer steroid injections because he is borderline diabetic and does not want to upset his hemoglobin A1c levels with a steroid injection.  Pain Location:  LEFT knee and LEFT hip  Radiation: none  Quality: dull, aching  Intensity/Severity: mild-moderate  Duration: several months  Progression of symptoms: yes, progressive worsening  Onset quality: gradual   Timing: intermittent  Aggravating Factors: going up and down stairs, rising after sitting  Alleviating Factors: NSAIDs, steroid injection (intra-articular), rest  Previous Episodes: yes  Associated Symptoms: pain, decreased strength  ADLs Affected: ambulating, work related activities, recreational activities/sports  Previous Treatment: NSAIDs       Objective   Vital Signs:   There were no vitals taken for this visit.    Physical Exam  Physical Exam  Vitals signs and nursing note reviewed.   Constitutional:       Appearance: Normal appearance.   Pulmonary:      Effort: Pulmonary effort is normal.   Skin:     General: Skin is warm and dry.      Capillary Refill: Capillary refill takes less than 2 seconds.   Neurological:      General: No focal deficit present.      Mental  Status: He is alert and oriented to person, place, and time. Mental status is at baseline.   Psychiatric:         Mood and Affect: Mood normal.         Behavior: Behavior normal.         Thought Content: Thought content normal.         Judgment: Judgment normal.     Ortho Exam   Left knee (varus). Patient has crepitus throughout range of motion. Positive patellar grind test. Mild effusion. Lachman is negative. Pivot shift is negative. Anterior and posterior drawer signs are negative. Significant joint line tenderness is noted on the medial aspect of the knee. Patient has a varus orientation of the knee. There is fullness and tenderness in the Popliteal fossa. Mild distention of a Popliteal cyst is noted in this location. Range of motion in flexion is from 0-120 degrees. Neurovascular status is intact.  Dorsalis pedis and posterior tibial artery pulses are palpable. Common peroneal nerve function is well preserved. Patient's gait is cautious and antalgic. Skin and soft tissues are mildly swollen, consistent with synovitis and effusion. The patient has a significant limp with the first few steps after starting the gait cycle. Getting out of a chair takes a lot of effort due to pain on knee flexion.   Left hip (gtb): Skin and soft tissues over the greater trochanteric bursa are tender upon palpation, along with IT band tenderness. Cross body adduction is negative. Internal and external rotations are bothersome and cause tenderness over the greater trochanter. There is no clinical deformity and no shortening. He does not have a limp upon walking. There is not piriformis tightness and there  is not capsular tightness with any rotation. Dorsalis pedis and posterior tibial artery pulses are palpable. Neurovascular status is intact and capillary refill is less than 2 seconds. Common peroneal nerve function is well preserved.          Result Review :{ Labs  Result Review  Imaging  Med Tab  Media :23}   The following data  was reviewed by: Haroon Mckoy MD on 11/17/2021:  Radiologic studies - see below for interpretation  LEFT hip with pelvis xrays  nonweightbearing ap/lateral views were performed at [facility] on 11/17/2021. Images were independently viewed and interpreted by myself, my impression as follows:      left Hip X-Ray  Indication: Pain and discomfort on the lateral aspect of the hip at the base of the greater trochanter.  AP and lateral  Findings: Anatomically acceptable position of the hip joint.  The joint surface is well preserved.  No osteophytes are noted.  no bony lesion  Soft tissues within normal limits  within normal limits joint spaces  Hardware appropriately positioned not applicable      no prior studies available for comparison.    X-RAY was ordered and reviewed by Haroon Mckoy MD          Procedures           Assessment   Assessment and Plan    Diagnoses and all orders for this visit:    1. Left hip pain (Primary)  -     XR Hip With or Without Pelvis 2 - 3 View Left    2. Primary osteoarthritis of one knee, left          Follow Up   · Compression/brace on the knee to prevent it from buckling and giving out.  · Intra-articular steroid injection and viscosupplementation injections discussed with the patient.  · Calcium and vitamin D for bone health discussed with the patient.  · Tablet meloxicam 7.5 mg tab 1 p.o. daily for pain swelling and discomfort.  · Rest, ice, compression, and elevation (RICE) therapy  · Stretching and strengthening exercises of the quads and hamstrings.  · OTC Tylenol 500-1000mg by mouth every 6 hours as needed for pain   · Follow up in 6 month(s)  • Patient was given instructions and counseling regarding his condition or for health maintenance advice. Please see specific information pulled into the AVS if appropriate.     Haroon Mckoy MD   Date of Encounter: 11/17/2021       EMR Dragon/Transcription disclaimer:  Much of this encounter note is an electronic transcription/translation  of spoken language to printed text. The electronic translation of spoken language may permit erroneous, or at times, nonsensical words or phrases to be inadvertently transcribed; Although I have reviewed the note for such errors, some may still exist.

## 2021-11-18 PROBLEM — M25.552 LEFT HIP PAIN: Status: ACTIVE | Noted: 2021-11-18

## 2022-04-01 ENCOUNTER — OFFICE VISIT (OUTPATIENT)
Dept: ORTHOPEDIC SURGERY | Facility: CLINIC | Age: 67
End: 2022-04-01

## 2022-04-01 VITALS
DIASTOLIC BLOOD PRESSURE: 73 MMHG | BODY MASS INDEX: 31.54 KG/M2 | WEIGHT: 178 LBS | HEART RATE: 83 BPM | HEIGHT: 63 IN | SYSTOLIC BLOOD PRESSURE: 120 MMHG

## 2022-04-01 DIAGNOSIS — M25.561 RIGHT KNEE PAIN, UNSPECIFIED CHRONICITY: Primary | ICD-10-CM

## 2022-04-01 DIAGNOSIS — M17.11 PRIMARY OSTEOARTHRITIS OF RIGHT KNEE: ICD-10-CM

## 2022-04-01 PROCEDURE — 99213 OFFICE O/P EST LOW 20 MIN: CPT | Performed by: ORTHOPAEDIC SURGERY

## 2022-07-13 ENCOUNTER — OFFICE VISIT (OUTPATIENT)
Dept: ORTHOPEDIC SURGERY | Facility: CLINIC | Age: 67
End: 2022-07-13

## 2022-07-13 DIAGNOSIS — M17.12 PRIMARY OSTEOARTHRITIS OF ONE KNEE, LEFT: Primary | ICD-10-CM

## 2022-07-13 DIAGNOSIS — M25.552 LEFT HIP PAIN: ICD-10-CM

## 2022-07-13 PROCEDURE — 99213 OFFICE O/P EST LOW 20 MIN: CPT | Performed by: ORTHOPAEDIC SURGERY

## 2022-07-13 NOTE — PROGRESS NOTES
Chief Complaint  Follow-up of the Right Knee, Follow-up of the Left Knee, and Follow-up of the Left Hip    Subjective    History of Present Illness      Mert Hewitt is a 66 y.o. male who presents to [facility] for follow-up on bilateral hip and bilateral knee pain and discomfort.  History of Present Illness the patient states that his symptoms are about status quo.  He has a lot of pain and discomfort at the end of the day.  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  The patient states that he would like to continue with nonoperative management as long as possible.  He wants to get a new brace today because the knee tends to buckle and give out from underneath him.  Pain Location:  BILATERAL knee and LEFT hip  Radiation: none  Quality: dull, aching  Intensity/Severity: mild-moderate  Duration: several years  Progression of symptoms: yes, progressive worsening  Onset quality: gradual   Timing: intermittent  Aggravating Factors: going up and down stairs, kneeling, rising after sitting, walking, squatting  Alleviating Factors: NSAIDs, rest, brace  Previous Episodes: yes  Associated Symptoms: pain, swelling  ADLs Affected: ambulating, work related activities, recreational activities/sports, meal preparation  Previous Treatment: NSAIDs and intra-articular injection       Objective   Vital Signs:   There were no vitals taken for this visit.    Physical Exam  Physical Exam  Vitals signs and nursing note reviewed.   Constitutional:       Appearance: Normal appearance.   Pulmonary:      Effort: Pulmonary effort is normal.   Skin:     General: Skin is warm and dry.      Capillary Refill: Capillary refill takes less than 2 seconds.   Neurological:      General: No focal deficit present.      Mental Status: He is alert and oriented to person,  place, and time. Mental status is at baseline.   Psychiatric:         Mood and Affect: Mood normal.         Behavior: Behavior normal.         Thought Content: Thought content normal.         Judgment: Judgment normal.     Ortho Exam   Bilateral knee (varus). Patient has crepitus throughout range of motion. Positive patellar grind test. Mild effusion. Lachman is negative. Pivot shift is negative. Anterior and posterior drawer signs are negative. Significant joint line tenderness is noted on the medial aspect of the knee. Patient has a varus orientation of the knee. There is fullness and tenderness in the Popliteal fossa. Mild distention of a Popliteal cyst is noted in this location. Range of motion in flexion is from 0-100 degrees. Neurovascular status is intact.  Dorsalis pedis and posterior tibial artery pulses are palpable. Common peroneal nerve function is well preserved. Patient's gait is cautious and antalgic. Skin and soft tissues are mildly swollen, consistent with synovitis and effusion. The patient has a significant limp with the first few steps after starting the gait cycle. Getting out of a chair takes a lot of effort due to pain on knee flexion.   Bilateral hip (gtb): Skin and soft tissues over the greater trochanteric bursa are tender upon palpation, along with IT band tenderness. Cross body adduction is negative. Internal and external rotations are bothersome and cause tenderness over the greater trochanter. There is no clinical deformity and no shortening. He does not have a limp upon walking. There is not piriformis tightness and there  is not capsular tightness with any rotation. Dorsalis pedis and posterior tibial artery pulses are palpable. Neurovascular status is intact and capillary refill is less than 2 seconds. Common peroneal nerve function is well preserved.            Result Review :   The following data was reviewed by: Haroon Mckoy MD on 07/13/2022:  Radiologic studies - see below for  interpretation  no diagnostic testing performed this visit            Procedures           Assessment   Assessment and Plan    Diagnoses and all orders for this visit:    1. Primary osteoarthritis of one knee, left (Primary)    2. Left hip pain          Follow Up {Instructions Charge Capture  Follow-up Communications :23}  · Compression/brace to the knee to prevent it from buckling and giving out.  · Calcium and vitamin D for bone health.  · Glucosamine, chondroitin and turmeric for cartilage health.  · Intra-articular steroid injection and viscosupplementation injections discussed with the patient.  · Discussed with the patient about the use of turmeric specifically for cartilage health.  · Rest, ice, compression, and elevation (RICE) therapy  · Stretching and strengthening exercises  · OTC Alternate Ibuprofen and Tylenol as needed  · Follow up in 6 month(s)  • Patient was given instructions and counseling regarding his condition or for health maintenance advice. Please see specific information pulled into the AVS if appropriate.     Haroon Mckoy MD   Date of Encounter: 7/13/2022   Electronically signed by Haroon Mckoy MD, 07/13/22, 2:02 PM EDT.     EMR Dragon/Transcription disclaimer:  Much of this encounter note is an electronic transcription/translation of spoken language to printed text. The electronic translation of spoken language may permit erroneous, or at times, nonsensical words or phrases to be inadvertently transcribed; Although I have reviewed the note for such errors, some may still exist.

## 2022-10-19 ENCOUNTER — OFFICE VISIT (OUTPATIENT)
Dept: ORTHOPEDIC SURGERY | Facility: CLINIC | Age: 67
End: 2022-10-19

## 2022-10-19 DIAGNOSIS — M17.12 PRIMARY OSTEOARTHRITIS OF ONE KNEE, LEFT: Primary | ICD-10-CM

## 2022-10-19 DIAGNOSIS — M17.11 PRIMARY OSTEOARTHRITIS OF RIGHT KNEE: ICD-10-CM

## 2022-10-19 DIAGNOSIS — M19.041 ARTHRITIS OF FINGER OF RIGHT HAND: ICD-10-CM

## 2022-10-19 PROCEDURE — 99213 OFFICE O/P EST LOW 20 MIN: CPT | Performed by: ORTHOPAEDIC SURGERY

## 2022-10-19 NOTE — PROGRESS NOTES
Chief Complaint  Follow-up of the Left Knee and Follow-up of the Right Knee    Subjective    History of Present Illness      Mert Hewitt is a 67 y.o. male who presents to [facility] for follow-up on bilateral knee pain and right hand second metacarpophalangeal joint pain.  History of Present Illness this patient is doing about status quo as far as his knees are concerned.  He does have quite a bit of pain and discomfort on the medial aspect of the left knee.  He has difficulty with squatting on the ground and difficulty going up and down the steps.  Cross body activities bother him quite significantly.  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  He also walks on a concrete surface several hours a day.  By the end of the day he is limping quite significantly.  The pain radiates from the medial joint line along the proximal tibia.  He states that the brace is actually quite beneficial in minimizing his symptoms and stabilizing the knee to some degree.  The patient also states that he has now started to develop some arthritis along the MCP joint of the right index and middle fingers.  He has this subjective feeling feeling that his  strength is getting affected in a negative fashion.  He denies any history of trauma to the hand.  There is a sense of stiffness of both of his hands and he does use hot water to keep the flexibility of the PIP and DIP joints of the fingers.  Pain Location:  BILATERAL knee and  BILATERAL hand  Radiation: none  Quality: dull  Intensity/Severity: moderate  Duration: Several months  Progression of symptoms: yes, progressive worsening  Onset quality: gradual   Timing: intermittent  Aggravating Factors: rising after sitting, squatting  Alleviating Factors: NSAIDs  Previous Episodes: yes  Associated Symptoms: pain, swelling  ADLs Affected: ambulating, work related  activities, recreational activities/sports  Previous Treatment: NSAIDs       Objective   Vital Signs:   There were no vitals taken for this visit.    Physical Exam  Physical Exam  Vitals signs and nursing note reviewed.   Constitutional:       Appearance: Normal appearance.   Pulmonary:      Effort: Pulmonary effort is normal.   Skin:     General: Skin is warm and dry.      Capillary Refill: Capillary refill takes less than 2 seconds.   Neurological:      General: No focal deficit present.      Mental Status: He is alert and oriented to person, place, and time. Mental status is at baseline.   Psychiatric:         Mood and Affect: Mood normal.         Behavior: Behavior normal.         Thought Content: Thought content normal.         Judgment: Judgment normal.     Ortho Exam   Bilateral knee (varus). Patient has crepitus throughout range of motion. Positive patellar grind test. Mild effusion. Lachman is negative. Pivot shift is negative. Anterior and posterior drawer signs are negative. Significant joint line tenderness is noted on the medial aspect of the knee. Patient has a varus orientation of the knee. There is fullness and tenderness in the Popliteal fossa. Mild distention of a Popliteal cyst is noted in this location. Range of motion in flexion is from 0-110 degrees. Neurovascular status is intact.  Dorsalis pedis and posterior tibial artery pulses are palpable. Common peroneal nerve function is well preserved. Patient's gait is cautious and antalgic. Skin and soft tissues are mildly swollen, consistent with synovitis and effusion. The patient has a significant limp with the first few steps after starting the gait cycle. Getting out of a chair takes a lot of effort due to pain on knee flexion.     Bilateral hand. Patient has a significant pain and swelling over the dorsal aspect of the DIP/PIP joints and multiple joints of the hand. There is the presence of Heberden’s nodes. There is some deformity with ulnar  and radial deviation of the distal part of the DIP joint. Flexor and extensor tendon functions are well preserved. Patient makes a fist slowly and painfully so.  strength to both pinch and power  is limited and the patient has a difficult time making a fist. Local tenderness is consistent with both arthritis as well as synovitis. There is some pitting of the nail plate        Result Review :   The following data was reviewed by: Haroon Mckoy MD on 10/19/2022:                Procedures           Assessment   Assessment and Plan    Diagnoses and all orders for this visit:    1. Primary osteoarthritis of one knee, left (Primary)    2. Primary osteoarthritis of right knee          Follow Up   · Compression/brace to the knee to prevent the knees from buckling and giving out.  · Calcium and vitamin D for bone health.  · Glucosamine, chondroitin and turmeric for cartilage health.  · Active mobilization of the digits to minimize the swelling and stiffness.  · Tablet meloxicam 7.5 mg tab 1 p.o. nightly for pain swelling and discomfort.  · Intra-articular steroid injections both to the hand joints as well as the knee joint's discussed with the patient.  He will let me know when he is ready for that form of intervention based on symptom progression.  · Rest, ice, compression, and elevation (RICE) therapy  · Stretching and strengthening exercises of the quads and the hamstrings.  · OTC Tylenol 500-1000mg by mouth every 6 hours as needed for pain   · Follow up in 6 month(s)  • Patient was given instructions and counseling regarding his condition or for health maintenance advice. Please see specific information pulled into the AVS if appropriate.     Haroon Mckoy MD   Date of Encounter: 10/19/2022       EMR Dragon/Transcription disclaimer:  Much of this encounter note is an electronic transcription/translation of spoken language to printed text. The electronic translation of spoken language may permit erroneous, or at  times, nonsensical words or phrases to be inadvertently transcribed; Although I have reviewed the note for such errors, some may still exist.

## 2022-10-30 PROBLEM — M19.041 ARTHRITIS OF FINGER OF RIGHT HAND: Status: ACTIVE | Noted: 2022-10-30

## 2022-12-15 ENCOUNTER — TELEPHONE (OUTPATIENT)
Dept: ORTHOPEDIC SURGERY | Facility: CLINIC | Age: 67
End: 2022-12-15

## 2022-12-15 NOTE — TELEPHONE ENCOUNTER
Caller: JARAD NEWSOME    Relationship to patient: SELF    Best call back number: [phone]    Chief complaint: LEFT SHOULDER PAIN    Type of visit: NEW PROBLEM    Additional notes: PT IS ESTABLISHED WITH DR LANCASTER & IS REQUESTING TO SEE HIM FOR LEFT SHOULDER PAIN. PER REF TOOL DR LANCASTER DOES NOT TREAT THIS DX.

## 2023-01-25 ENCOUNTER — OFFICE VISIT (OUTPATIENT)
Dept: ORTHOPEDIC SURGERY | Facility: CLINIC | Age: 68
End: 2023-01-25
Payer: COMMERCIAL

## 2023-01-25 VITALS — HEIGHT: 63 IN | WEIGHT: 178 LBS | BODY MASS INDEX: 31.54 KG/M2

## 2023-01-25 DIAGNOSIS — M25.512 LEFT SHOULDER PAIN, UNSPECIFIED CHRONICITY: Primary | ICD-10-CM

## 2023-01-25 DIAGNOSIS — M25.552 LEFT HIP PAIN: ICD-10-CM

## 2023-01-25 PROCEDURE — 99213 OFFICE O/P EST LOW 20 MIN: CPT | Performed by: ORTHOPAEDIC SURGERY

## 2023-02-09 ENCOUNTER — TELEPHONE (OUTPATIENT)
Dept: ORTHOPEDIC SURGERY | Facility: CLINIC | Age: 68
End: 2023-02-09

## 2023-02-09 NOTE — TELEPHONE ENCOUNTER
Spoke to patient and advised that he could drop off the handicap form and we could fill it out and have Dr Mckoy sing it. I did inform him Dr Mckoy is only here on Wednesday's, so it wouldn't be ready right away. He voiced understanding and stated he would come and drop it off tomorrow 2/10/2023. He ended the call with thanked me for calling him back.

## 2023-02-09 NOTE — TELEPHONE ENCOUNTER
Caller: JARAD NEWSOME    Relationship to patient: SELF    Best call back number: [phone]    Patient is needing: PATIENT WANTS TO DROP OFF HANDICAPPED FORM FOR DR. LANCASTER.

## 2023-11-08 ENCOUNTER — OFFICE VISIT (OUTPATIENT)
Dept: ORTHOPEDIC SURGERY | Facility: CLINIC | Age: 68
End: 2023-11-08
Payer: COMMERCIAL

## 2023-11-08 DIAGNOSIS — M17.11 PRIMARY OSTEOARTHRITIS OF RIGHT KNEE: Primary | ICD-10-CM

## 2023-11-08 DIAGNOSIS — M17.12 PRIMARY OSTEOARTHRITIS OF LEFT KNEE: ICD-10-CM

## 2023-11-08 PROCEDURE — 99213 OFFICE O/P EST LOW 20 MIN: CPT | Performed by: ORTHOPAEDIC SURGERY

## 2023-11-08 NOTE — PROGRESS NOTES
Chief Complaint  Follow-up of the Right Knee and Follow-up of the Left Knee    Subjective    History of Present Illness      Mert Hewitt is a 68 y.o. male who presents to [facility] for bilateral knee pain and discomfort.  History of Present Illness the patient is actually status quo following conservative management of both his knees.  Range of motion continues to improve with physical therapy.  He has been quite active around the house and around the yard putting things together.  He states that he is careful at work and by the end of the day he does have a slight limp but he takes precautions including the use of a brace which minimizes the pressure and the symptoms around his knee joints.  The patient is diabetic and attempts to keep his blood sugar levels well-contained.  At this point he is satisfied with the conservative, nonoperative management of his knee pathology.  He will eventually require viscosupplementation injection because he is not a good candidate for steroid injections.  We have also discussed at length the possibility of knee replacement surgery and he understands and accepts that situation.  Pain Location:  BILATERAL knee  Radiation: none  Quality: dull  Intensity/Severity: mild-moderate  Duration:  several years  Progression of symptoms: no worsening, symptoms stable/unchanged  Onset quality: gradual   Timing: intermittent  Aggravating Factors: going up and down stairs, kneeling, rising after sitting  Alleviating Factors: NSAIDs  Previous Episodes: yes  Associated Symptoms: pain, decreased ROM  ADLs Affected: ambulating, work related activities, recreational activities/sports  Previous Treatment: NSAIDs       Objective   Vital Signs:   There were no vitals taken for this visit.    Physical Exam  Physical Exam  Vitals signs and nursing note reviewed.   Constitutional:       Appearance: Normal appearance.   Pulmonary:      Effort: Pulmonary effort is normal.    Skin:     General: Skin is warm and dry.      Capillary Refill: Capillary refill takes less than 2 seconds.   Neurological:      General: No focal deficit present.      Mental Status: He is alert and oriented to person, place, and time. Mental status is at baseline.   Psychiatric:         Mood and Affect: Mood normal.         Behavior: Behavior normal.         Thought Content: Thought content normal.         Judgment: Judgment normal.     Ortho Exam   Bilateral knee (varus). Patient has crepitus throughout range of motion. Positive patellar grind test. Mild effusion. Lachman is negative. Pivot shift is negative. Anterior and posterior drawer signs are negative. Significant joint line tenderness is noted on the medial aspect of the knee. Patient has a varus orientation of the knee. There is fullness and tenderness in the Popliteal fossa. Mild distention of a Popliteal cyst is noted in this location. Range of motion in flexion is from 0-110 degrees. Neurovascular status is intact.  Dorsalis pedis and posterior tibial artery pulses are palpable. Common peroneal nerve function is well preserved. Patient's gait is cautious and antalgic. Skin and soft tissues are mildly swollen, consistent with synovitis and effusion. The patient has a significant limp with the first few steps after starting the gait cycle. Getting out of a chair takes a lot of effort due to pain on knee flexion.       Result Review :  The following data was reviewed by: Haroon Mckoy MD on 11/08/2023:  Radiologic studies - see below for interpretation  no diagnostic testing performed this visit            Procedures           Assessment   Assessment and Plan    Diagnoses and all orders for this visit:    1. Primary osteoarthritis of right knee (Primary)    2. Primary osteoarthritis of left knee          Follow Up   Compression/brace to prevent the knee from buckling and giving out.  Calcium and vitamin D for bone health.  Glucosamine, chondroitin and  turmeric for cartilage health.  Intra-articular viscosupplementation injections discussed with the patient at length.  He will eventually need total knee arthroplasty based on symptom progression but we would like to defer that surgical intervention as long as possible.  Rest, ice, compression, and elevation (RICE) therapy  Stretching and strengthening exercises  OTC Alternate Ibuprofen and Tylenol as needed  Follow up as needed  Patient was given instructions and counseling regarding his condition or for health maintenance advice. Please see specific information pulled into the AVS if appropriate.     Haroon Mckoy MD   Date of Encounter: 11/8/2023   Electronically signed by Haroon Mckoy MD, 11/08/23, 2:37 PM EST.     EMR Dragon/Transcription disclaimer:  Much of this encounter note is an electronic transcription/translation of spoken language to printed text. The electronic translation of spoken language may permit erroneous, or at times, nonsensical words or phrases to be inadvertently transcribed; Although I have reviewed the note for such errors, some may still exist.